# Patient Record
(demographics unavailable — no encounter records)

---

## 2024-10-10 NOTE — DISCUSSION/SUMMARY
[Normal Growth] : growth [Normal Development] : development [None] : No known medical problems [No Elimination Concerns] : elimination [No Feeding Concerns] : feeding [No Skin Concerns] : skin [Normal Sleep Pattern] : sleep [No Medications] : ~He/She~ is not on any medications [Parent/Guardian] : parent/guardian [] : The components of the vaccine(s) to be administered today are listed in the plan of care. The disease(s) for which the vaccine(s) are intended to prevent and the risks have been discussed with the caretaker.  The risks are also included in the appropriate vaccination information statements which have been provided to the patient's caregiver.  The caregiver has given consent to vaccinate. [FreeTextEntry1] : Continue whole cow's milk. Continue table foods, 3 meals with 2-3 snacks per day. Incorporate flourinated water daily in a sippy cup. Brush teeth twice a day with soft toothbrush. Recommend visit to dentist. As per seat 's guidelines, use foward-facing car seat in back seat of car. Put todder to sleep in own bed or crib. Help toddler to maintain consistent daily routines and sleep schedule. Toilet training discussed. Recognize anxiety in new settings. Ensure home is safe. Be within arm's reach of toddler at all times. Use consistent, positive discipline. Read aloud to toddler.

## 2024-10-10 NOTE — PHYSICAL EXAM

## 2024-10-10 NOTE — HISTORY OF PRESENT ILLNESS
[Father] : father [Fruit] : fruit [Vegetables] : vegetables [Finger Foods] : finger foods [Table food] : table food [Normal] : Normal [Tap water] : Primary Fluoride Source: Tap water [No] : No cigarette smoke exposure [Water heater temperature set at <120 degrees F] : Water heater temperature set at <120 degrees F [Car seat in back seat] : Car seat in back seat [Carbon Monoxide Detectors] : Carbon monoxide detectors [Smoke Detectors] : Smoke detectors [Up to date] : Up to date

## 2024-10-10 NOTE — DEVELOPMENTAL MILESTONES
[Normal Development] : Normal Development [None] : none [Uses 6 to 10 words other than] : uses 6 to 10 words other than names

## 2025-03-25 NOTE — DISCUSSION/SUMMARY
[Normal Growth] : growth [Normal Development] : development [None] : No known medical problems [No Elimination Concerns] : elimination [No Feeding Concerns] : feeding [No Skin Concerns] : skin [Normal Sleep Pattern] : sleep [No Medications] : ~He/She~ is not on any medications [Parent/Guardian] : parent/guardian [] : The components of the vaccine(s) to be administered today are listed in the plan of care. The disease(s) for which the vaccine(s) are intended to prevent and the risks have been discussed with the caretaker.  The risks are also included in the appropriate vaccination information statements which have been provided to the patient's caregiver.  The caregiver has given consent to vaccinate. [FreeTextEntry1] : Continue cow's milk. Continue table foods, 3 meals with 2-3 snacks per day. Incorporate flourinated water daily in a sippy cup. Brush teeth twice a day with soft toothbrush. Recommend visit to dentist. As per seat 's guidelines, use foward-facing car seat in back seat of car. Put toddler to sleep in own bed. Help toddler to maintain consistent daily routines and sleep schedule. Toilet training discussed. Ensure home is safe. Use consistent, positive discipline. Read aloud to toddler. Limit screen time to no more than 2 hours per day. Refer to [facility] for Speech

## 2025-03-25 NOTE — HISTORY OF PRESENT ILLNESS
[Parents] : parents [Fruit] : fruit [Vegetables] : vegetables [Table food] : table food [Normal] : Normal [Tap water] : Primary Fluoride Source: Tap water [No] : No cigarette smoke exposure [Water heater temperature set at <120 degrees F] : Water heater temperature set at <120 degrees F [Car seat in back seat] : Car seat in back seat [Smoke Detectors] : Smoke detectors [Carbon Monoxide Detectors] : Carbon monoxide detectors [At risk for exposure to TB] : Not at risk for exposure to Tuberculosis [Up to date] : Up to date [de-identified] : Vegan Diet

## 2025-03-25 NOTE — PHYSICAL EXAM

## 2025-03-25 NOTE — DEVELOPMENTAL MILESTONES
[Plays alongside other children] : plays alongside other children [Uses 50 words] : does not use 50 words [Combine 2 words into phrase or] : does not combine 2 words into phrase or sentences [Follows 2-step command] : follows 2-step command [Uses words that are 50% intelligible] : does not use words that are 50% intelligible to strangers

## 2025-07-23 NOTE — DISCUSSION/SUMMARY
[FreeTextEntry1] :  2 yr old female with fever, emesis, diarrhea x 3 days likely viral gastroenteritis. In order to maintain hydration consume "oral rehydration solution," such as Pedialyte or low calorie sports drinks. If vomiting, try to give child a few teaspoons of fluid every few minutes. Avoid drinking juice or soda. These can make diarrhea worse. If tolerating solids, its best to consume lean meats, fruits, vegetables, and whole-grain breads and cereals. Avoid eating foods with a lot of fat or sugar, which can make symptoms worse.

## 2025-07-23 NOTE — HISTORY OF PRESENT ILLNESS
[Fever] : FEVER [___ Day(s)] : [unfilled] day(s) [Decreased Appetite] : decreased appetite [Vomiting] : vomiting [Diarrhea] : diarrhea [Max Temp: ____] : Max temperature: [unfilled] [Intermittent] : intermittent [Sick Contacts: ___] : no sick contacts [Acetaminophen] : acetaminophen [Ibuprofen] : ibuprofen [Last dose: _____] : Last dose: [unfilled] [Runny Nose] : no runny nose [Cough] : no cough [Wheezing] : no wheezing